# Patient Record
(demographics unavailable — no encounter records)

---

## 2025-02-25 NOTE — PHYSICAL EXAM
[Normal] : affect was normal and insight and judgment were intact [de-identified] : diffuse expiratory wheezing on exam

## 2025-02-25 NOTE — HISTORY OF PRESENT ILLNESS
[de-identified] : Patient is a 48 year old F with a PMHx of Moderate Persistent asthma and obesity coming in asthma.  Patient reports her asthma has worsened recently with no improvement on current regime.  Patient reports increased wheezing today as well. Ambulatory

## 2025-04-03 NOTE — PROCEDURE
[FreeTextEntry1] : Spirometry   mild restriction, moderate obstruction mildly low TLC,  normal dlco  Nehemiah Dunn MD

## 2025-04-03 NOTE — PHYSICAL EXAM
[No Acute Distress] : no acute distress [Well Nourished] : well nourished [Well Developed] : well developed [II] : Mallampati Class: II [2+] : Right Tonsil: 2+ [Supple] : supple [No Neck Mass] : no neck mass [No JVD] : no jvd [Normal Rate/Rhythm] : normal rate/rhythm [Normal S1, S2] : normal s1, s2 [No Murmurs] : no murmurs [No Resp Distress] : no resp distress [Clear to Auscultation Bilaterally] : clear to auscultation bilaterally [Benign] : benign [Not Tender] : not tender [No Clubbing] : no clubbing [No Edema] : no edema [No Focal Deficits] : no focal deficits [Oriented x3] : oriented x3 [Normal Affect] : normal affect [TextBox_44] : short neck

## 2025-04-03 NOTE — PHYSICAL EXAM
[No Acute Distress] : no acute distress [Well Nourished] : well nourished [Well Developed] : well developed [II] : Mallampati Class: II [2+] : Right Tonsil: 2+ [Supple] : supple [No Neck Mass] : no neck mass [No JVD] : no jvd [Normal Rate/Rhythm] : normal rate/rhythm [Normal S1, S2] : normal s1, s2 [No Murmurs] : no murmurs [No Resp Distress] : no resp distress [Clear to Auscultation Bilaterally] : clear to auscultation bilaterally [Benign] : benign [Not Tender] : not tender [No Edema] : no edema [No Clubbing] : no clubbing [No Focal Deficits] : no focal deficits [Oriented x3] : oriented x3 [Normal Affect] : normal affect [TextBox_44] : short neck

## 2025-04-03 NOTE — DISCUSSION/SUMMARY
[FreeTextEntry1] : 47 year old woman with asthma since childhood, allergies and aspirin allergy  She ha shad asthma exacerbation and was hospitalized.  Took prednisone taper  after discharge.  She last had exacerbation 2 weeks prior  Doing well now  On ICS LABA    evaluate aspirin sensitivity. States she  has throat closing.  She does not take NSAID  PLAN  continue Symbicort   increase to160/4.5 2 puffs twice per day, because of PFT  albuterol MDI as needed   add montelukast for AERD  Reassess with allergy testing   CXR reviewed  Total time spent : 40 minutes Including: Preparation prior to visit - Reviewing prior record, results of tests and Consultation Reports as applicable Conducting an appropriate H & P during today's encounter  reviewing all available CT chest exams.  demonstrating images to pt as appropriate Counseling patient  Note completion  med renewal discussing differential diagnosis    Nehemiah Dunn MD

## 2025-04-03 NOTE — HISTORY OF PRESENT ILLNESS
[Never] : never [TextBox_4] : 47 year old patient presents for evaluation of asthma.  recently hospitalized  05/27 at [facility].  Completed prednisone.  Has not had prior severe exacerbation.  had been on albuterol MDI alone in past.   Has  had asthma since childhood   last hospitalized 2 years ago    She has seasonal allergies as well as  to cats and dogs as well  aspirin  Primary doctor is  Dr Rodgers.     PSH:    none     PMH:    asthma    skin rash sensitive skin, has used steroid cream     SH:   never smoker       ETOH:  occasional     Occupation: works in shelter   No exposure to chemicals, dust, asbestos, mold   has had water leak fixed     ALLERGY:      allergic to  Aspirin      environmental/seasonal allergy:  pollen, dogs cats       Review of Systems:    no sinusitis, sinus infections,   some nasal obstruction no dysphagia no dry mouth   no arthritis no joint aches no joint swelling     no pneumonia  no lung cancer   no CAD no MI no chest pain no murmur no CHF no HTN no edema   no peptic ulcer or gastritis no GERD no abdominal pain no liver disease   no Diabetes no thyroid disease no hyperlipidemia     no bleeding   no DVT or PE   no kidney disease   no stroke no seizure

## 2025-04-03 NOTE — HISTORY OF PRESENT ILLNESS
[Never] : never [TextBox_4] : 47 year old patient presents for evaluation of asthma.  recently hospitalized  05/27 at [facility].  Completed prednisone.  Has not had prior severe exacerbation.  had been on albuterol MDI alone in past.   Has  had asthma since childhood   last hospitalized 2 years ago    She has seasonal allergies as well as  to cats and dogs as well  aspirin  Primary doctor is  Dr Rodgers.     PSH:    none     PMH:    asthma    skin rash sensitive skin, has used steroid cream     SH:   never smoker       ETOH:  occasional     Occupation: works in shelter   No exposure to chemicals, dust, asbestos, mold   has had water leak fixed     ALLERGY:      allergic to  Aspirin      environmental/seasonal allergy:  pollen, dogs cats       Review of Systems:    no sinusitis, sinus infections,   some nasal obstruction no dysphagia no dry mouth   no arthritis no joint aches no joint swelling     no pneumonia  no lung cancer   no CAD no MI no chest pain no murmur no CHF no HTN no edema   no peptic ulcer or gastritis no GERD no abdominal pain no liver disease   no Diabetes no thyroid disease no hyperlipidemia     no bleeding   no DVT or PE   no kidney disease   no stroke no seizure